# Patient Record
Sex: MALE | Race: OTHER | HISPANIC OR LATINO | URBAN - METROPOLITAN AREA
[De-identification: names, ages, dates, MRNs, and addresses within clinical notes are randomized per-mention and may not be internally consistent; named-entity substitution may affect disease eponyms.]

---
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
## 2022-08-15 ENCOUNTER — APPOINTMENT (OUTPATIENT)
Dept: ORTHOPEDIC SURGERY | Facility: CLINIC | Age: 22
End: 2022-08-15

## 2022-08-15 VITALS — BODY MASS INDEX: 26.82 KG/M2 | HEIGHT: 65 IN | WEIGHT: 161 LBS

## 2022-08-15 DIAGNOSIS — M25.732 OSTEOPHYTE, LEFT WRIST: ICD-10-CM

## 2022-08-15 PROBLEM — Z00.00 ENCOUNTER FOR PREVENTIVE HEALTH EXAMINATION: Status: ACTIVE | Noted: 2022-08-15

## 2022-08-15 PROCEDURE — 99202 OFFICE O/P NEW SF 15 MIN: CPT

## 2022-08-15 NOTE — HISTORY OF PRESENT ILLNESS
[de-identified] : 22-year-old male had a mass present in his left hand.  It was larger when he 1st made the appointment.  Id he does not have much in the way of pain discomfort.  This is mass present was dorsal aspect of his left hand.  There is less swelling than was before.

## 2022-08-15 NOTE — PHYSICAL EXAM
[de-identified] :   Patient is a mass present the CMC joint left hand.  Is consistent with a carpometacarpal boss.  There is no definite ganglion cyst associated this area.  Id there is normal sensation normal capillary refill.  Id there is good range of motion to the fingers wrist and hand.  There is no erythema ecchymoses or abrasions.

## 2022-08-15 NOTE — ASSESSMENT
[FreeTextEntry1] :   Patient is a carpometacarpal boss present on the left hand area.  It does not cause him pain discomfort..  The natural history of this condition was discussed with the patient id he will see me back on an as-needed basis..  If the mass gets bigger he will contact the office..  Right now not bothering him enough to warrant any intervention.  Postoperative course including immobilization was discussed

## 2025-07-24 ENCOUNTER — NON-APPOINTMENT (OUTPATIENT)
Age: 25
End: 2025-07-24

## 2025-07-26 ENCOUNTER — NON-APPOINTMENT (OUTPATIENT)
Age: 25
End: 2025-07-26